# Patient Record
Sex: MALE | Race: WHITE | Employment: FULL TIME | URBAN - NONMETROPOLITAN AREA
[De-identification: names, ages, dates, MRNs, and addresses within clinical notes are randomized per-mention and may not be internally consistent; named-entity substitution may affect disease eponyms.]

---

## 2021-06-17 ENCOUNTER — TELEPHONE (OUTPATIENT)
Dept: SURGERY | Age: 56
End: 2021-06-17

## 2021-06-17 NOTE — LETTER
[address] [facility] department of [place] [address]  Phone: [phone]  Fax: [phone]    Shadia Henson MD        June 17, 2021     [address] [facility] [address]      Dear Ansley Fan: We are sorry that you missed your appointment with Dr. Maximiliano Vásquez on 6/17/2021. Your health and follow-up medical care are important to us. Please call our office as soon as possible so that we may reschedule your appointment. If you have already rescheduled your appointment, please disregard this letter.     Sincerely,        Shadia Henson MD